# Patient Record
Sex: FEMALE | Race: BLACK OR AFRICAN AMERICAN | Employment: UNEMPLOYED | URBAN - METROPOLITAN AREA
[De-identification: names, ages, dates, MRNs, and addresses within clinical notes are randomized per-mention and may not be internally consistent; named-entity substitution may affect disease eponyms.]

---

## 2018-12-25 ENCOUNTER — HOSPITAL ENCOUNTER (EMERGENCY)
Age: 23
Discharge: HOME | End: 2018-12-25
Payer: COMMERCIAL

## 2018-12-25 VITALS
OXYGEN SATURATION: 100 % | SYSTOLIC BLOOD PRESSURE: 127 MMHG | HEIGHT: 68 IN | DIASTOLIC BLOOD PRESSURE: 82 MMHG | HEART RATE: 88 BPM | RESPIRATION RATE: 16 BRPM | BODY MASS INDEX: 29.6 KG/M2 | TEMPERATURE: 99.3 F | WEIGHT: 195.33 LBS

## 2018-12-25 DIAGNOSIS — R11.2 NAUSEA VOMITING AND DIARRHEA: Primary | ICD-10-CM

## 2018-12-25 DIAGNOSIS — R19.7 NAUSEA VOMITING AND DIARRHEA: Primary | ICD-10-CM

## 2018-12-25 DIAGNOSIS — R51.9 FREQUENT HEADACHES: ICD-10-CM

## 2018-12-25 LAB
A/G RATIO: 1.1 (ref 1.1–2.2)
ALBUMIN SERPL-MCNC: 4.4 G/DL (ref 3.4–5)
ALP BLD-CCNC: 89 U/L (ref 40–129)
ALT SERPL-CCNC: 7 U/L (ref 10–40)
ANION GAP SERPL CALCULATED.3IONS-SCNC: 15 MMOL/L (ref 3–16)
AST SERPL-CCNC: 15 U/L (ref 15–37)
BASOPHILS ABSOLUTE: 0 K/UL (ref 0–0.2)
BASOPHILS RELATIVE PERCENT: 0.3 %
BILIRUB SERPL-MCNC: <0.2 MG/DL (ref 0–1)
BILIRUBIN URINE: NEGATIVE
BLOOD, URINE: ABNORMAL
BUN BLDV-MCNC: 13 MG/DL (ref 7–20)
CALCIUM SERPL-MCNC: 9.4 MG/DL (ref 8.3–10.6)
CHLORIDE BLD-SCNC: 102 MMOL/L (ref 99–110)
CLARITY: ABNORMAL
CO2: 21 MMOL/L (ref 21–32)
COLOR: ABNORMAL
CREAT SERPL-MCNC: 0.7 MG/DL (ref 0.6–1.1)
EOSINOPHILS ABSOLUTE: 0 K/UL (ref 0–0.6)
EOSINOPHILS RELATIVE PERCENT: 0.3 %
EPITHELIAL CELLS, UA: NORMAL /HPF
GFR AFRICAN AMERICAN: >60
GFR NON-AFRICAN AMERICAN: >60
GLOBULIN: 4 G/DL
GLUCOSE BLD-MCNC: 85 MG/DL (ref 70–99)
GLUCOSE URINE: NEGATIVE MG/DL
HCG(URINE) PREGNANCY TEST: NEGATIVE
HCT VFR BLD CALC: 37.4 % (ref 36–48)
HEMOGLOBIN: 11.9 G/DL (ref 12–16)
KETONES, URINE: NEGATIVE MG/DL
LEUKOCYTE ESTERASE, URINE: ABNORMAL
LIPASE: 27 U/L (ref 13–60)
LYMPHOCYTES ABSOLUTE: 1.5 K/UL (ref 1–5.1)
LYMPHOCYTES RELATIVE PERCENT: 13.6 %
MCH RBC QN AUTO: 25.3 PG (ref 26–34)
MCHC RBC AUTO-ENTMCNC: 31.8 G/DL (ref 31–36)
MCV RBC AUTO: 79.5 FL (ref 80–100)
MICROSCOPIC EXAMINATION: YES
MONOCYTES ABSOLUTE: 0.7 K/UL (ref 0–1.3)
MONOCYTES RELATIVE PERCENT: 6.4 %
NEUTROPHILS ABSOLUTE: 8.6 K/UL (ref 1.7–7.7)
NEUTROPHILS RELATIVE PERCENT: 79.4 %
NITRITE, URINE: NEGATIVE
PDW BLD-RTO: 15.5 % (ref 12.4–15.4)
PH UA: 6
PLATELET # BLD: 312 K/UL (ref 135–450)
PMV BLD AUTO: 7.5 FL (ref 5–10.5)
POTASSIUM SERPL-SCNC: 3.9 MMOL/L (ref 3.5–5.1)
PROTEIN UA: NEGATIVE MG/DL
RBC # BLD: 4.7 M/UL (ref 4–5.2)
RBC UA: NORMAL /HPF (ref 0–2)
SODIUM BLD-SCNC: 138 MMOL/L (ref 136–145)
SPECIFIC GRAVITY UA: >=1.03
TOTAL PROTEIN: 8.4 G/DL (ref 6.4–8.2)
URINE REFLEX TO CULTURE: YES
URINE TYPE: ABNORMAL
UROBILINOGEN, URINE: 0.2 E.U./DL
WBC # BLD: 10.9 K/UL (ref 4–11)
WBC UA: NORMAL /HPF (ref 0–5)

## 2018-12-25 PROCEDURE — 96374 THER/PROPH/DIAG INJ IV PUSH: CPT

## 2018-12-25 PROCEDURE — 36415 COLL VENOUS BLD VENIPUNCTURE: CPT

## 2018-12-25 PROCEDURE — 84703 CHORIONIC GONADOTROPIN ASSAY: CPT

## 2018-12-25 PROCEDURE — 6360000002 HC RX W HCPCS: Performed by: PHYSICIAN ASSISTANT

## 2018-12-25 PROCEDURE — 2580000003 HC RX 258: Performed by: PHYSICIAN ASSISTANT

## 2018-12-25 PROCEDURE — 80053 COMPREHEN METABOLIC PANEL: CPT

## 2018-12-25 PROCEDURE — 96361 HYDRATE IV INFUSION ADD-ON: CPT

## 2018-12-25 PROCEDURE — 81001 URINALYSIS AUTO W/SCOPE: CPT

## 2018-12-25 PROCEDURE — 96375 TX/PRO/DX INJ NEW DRUG ADDON: CPT

## 2018-12-25 PROCEDURE — 87086 URINE CULTURE/COLONY COUNT: CPT

## 2018-12-25 PROCEDURE — 83690 ASSAY OF LIPASE: CPT

## 2018-12-25 PROCEDURE — 99284 EMERGENCY DEPT VISIT MOD MDM: CPT

## 2018-12-25 PROCEDURE — 85025 COMPLETE CBC W/AUTO DIFF WBC: CPT

## 2018-12-25 RX ORDER — KETOROLAC TROMETHAMINE 30 MG/ML
30 INJECTION, SOLUTION INTRAMUSCULAR; INTRAVENOUS ONCE
Status: COMPLETED | OUTPATIENT
Start: 2018-12-25 | End: 2018-12-25

## 2018-12-25 RX ORDER — 0.9 % SODIUM CHLORIDE 0.9 %
1000 INTRAVENOUS SOLUTION INTRAVENOUS ONCE
Status: COMPLETED | OUTPATIENT
Start: 2018-12-25 | End: 2018-12-25

## 2018-12-25 RX ORDER — ONDANSETRON 4 MG/1
4 TABLET, ORALLY DISINTEGRATING ORAL EVERY 8 HOURS PRN
Qty: 10 TABLET | Refills: 0 | Status: SHIPPED | OUTPATIENT
Start: 2018-12-25 | End: 2019-06-23 | Stop reason: ALTCHOICE

## 2018-12-25 RX ORDER — DICYCLOMINE HYDROCHLORIDE 10 MG/1
10 CAPSULE ORAL 4 TIMES DAILY
Qty: 10 CAPSULE | Refills: 0 | Status: SHIPPED | OUTPATIENT
Start: 2018-12-25 | End: 2022-02-11

## 2018-12-25 RX ORDER — ONDANSETRON 2 MG/ML
4 INJECTION INTRAMUSCULAR; INTRAVENOUS ONCE
Status: COMPLETED | OUTPATIENT
Start: 2018-12-25 | End: 2018-12-25

## 2018-12-25 RX ADMIN — KETOROLAC TROMETHAMINE 30 MG: 30 INJECTION, SOLUTION INTRAMUSCULAR at 18:12

## 2018-12-25 RX ADMIN — SODIUM CHLORIDE 1000 ML: 9 INJECTION, SOLUTION INTRAVENOUS at 18:12

## 2018-12-25 RX ADMIN — ONDANSETRON 4 MG: 2 INJECTION INTRAMUSCULAR; INTRAVENOUS at 18:12

## 2018-12-25 ASSESSMENT — ENCOUNTER SYMPTOMS
SORE THROAT: 0
NAUSEA: 1
ABDOMINAL PAIN: 1
DIARRHEA: 1
VOMITING: 1
BACK PAIN: 0
SHORTNESS OF BREATH: 0

## 2018-12-25 ASSESSMENT — PAIN SCALES - GENERAL
PAINLEVEL_OUTOF10: 3

## 2018-12-27 LAB — URINE CULTURE, ROUTINE: NORMAL

## 2018-12-27 NOTE — PROGRESS NOTES
Culture result reviewed, no further treatment needed. Patient's urine culture grew less than 10,000 colonies of mixed palomo. She did not receive antibiotics in the emergency department, and there is no indication for antibiotic treatment.

## 2019-06-23 ENCOUNTER — HOSPITAL ENCOUNTER (EMERGENCY)
Age: 24
Discharge: HOME | End: 2019-06-23
Payer: COMMERCIAL

## 2019-06-23 VITALS
HEART RATE: 93 BPM | OXYGEN SATURATION: 99 % | HEIGHT: 68 IN | SYSTOLIC BLOOD PRESSURE: 110 MMHG | BODY MASS INDEX: 31.94 KG/M2 | RESPIRATION RATE: 16 BRPM | WEIGHT: 210.76 LBS | DIASTOLIC BLOOD PRESSURE: 75 MMHG | TEMPERATURE: 97.8 F

## 2019-06-23 DIAGNOSIS — N39.0 URINARY TRACT INFECTION WITHOUT HEMATURIA, SITE UNSPECIFIED: Primary | ICD-10-CM

## 2019-06-23 LAB
AMORPHOUS: ABNORMAL /HPF
BILIRUBIN URINE: NEGATIVE
BLOOD, URINE: NEGATIVE
CLARITY: CLEAR
COLOR: YELLOW
EPITHELIAL CELLS, UA: ABNORMAL /HPF
GLUCOSE URINE: NEGATIVE MG/DL
KETONES, URINE: NEGATIVE MG/DL
LEUKOCYTE ESTERASE, URINE: ABNORMAL
MICROSCOPIC EXAMINATION: YES
MUCUS: ABNORMAL /LPF
NITRITE, URINE: NEGATIVE
PH UA: 7 (ref 5–8)
PROTEIN UA: NEGATIVE MG/DL
RBC UA: ABNORMAL /HPF (ref 0–2)
SPECIFIC GRAVITY UA: 1.01 (ref 1–1.03)
URINE REFLEX TO CULTURE: YES
URINE TYPE: ABNORMAL
UROBILINOGEN, URINE: 0.2 E.U./DL
WBC UA: ABNORMAL /HPF (ref 0–5)

## 2019-06-23 PROCEDURE — 81001 URINALYSIS AUTO W/SCOPE: CPT

## 2019-06-23 PROCEDURE — 6370000000 HC RX 637 (ALT 250 FOR IP): Performed by: NURSE PRACTITIONER

## 2019-06-23 PROCEDURE — 99283 EMERGENCY DEPT VISIT LOW MDM: CPT

## 2019-06-23 PROCEDURE — 87186 SC STD MICRODIL/AGAR DIL: CPT

## 2019-06-23 PROCEDURE — 87086 URINE CULTURE/COLONY COUNT: CPT

## 2019-06-23 PROCEDURE — 87077 CULTURE AEROBIC IDENTIFY: CPT

## 2019-06-23 RX ORDER — FERROUS SULFATE 325(65) MG
650 TABLET ORAL DAILY
Refills: 5 | COMMUNITY
Start: 2019-04-09 | End: 2022-09-15

## 2019-06-23 RX ORDER — CEPHALEXIN 250 MG/1
500 CAPSULE ORAL ONCE
Status: COMPLETED | OUTPATIENT
Start: 2019-06-23 | End: 2019-06-23

## 2019-06-23 RX ORDER — CEPHALEXIN 500 MG/1
500 CAPSULE ORAL 2 TIMES DAILY
Qty: 14 CAPSULE | Refills: 0 | Status: SHIPPED | OUTPATIENT
Start: 2019-06-23 | End: 2019-06-29 | Stop reason: ALTCHOICE

## 2019-06-23 RX ADMIN — CEPHALEXIN 500 MG: 250 CAPSULE ORAL at 20:53

## 2019-06-24 NOTE — ED PROVIDER NOTES
[address] [facility] Riya [id number]  Dept: [phone]  Loc: [address] ENCOUNTER        This patient was not seen or evaluated by the attending physician. I evaluated this patient, the attending physician was available for consultation. CHIEF COMPLAINT    Chief Complaint   Patient presents with    Other     7 months pregnant. Frequent urination, strong smell to it, back pain. Denies any pain nor buring with urination. Denies any vaginal discharge. States \"I know it's a UTI, I've had them before\"       HPI    Anusha Palm is a 26 y.o. female who presents with complaint of increased urinary frequency and urgency over the past 4 to 5 days. No dysuria, gross hematuria. She has had previous urinary tract infections, states that her symptoms are very similar to her previous urinary tract infections. No nausea, vomiting, abdominal pain, pelvic pain, unusual vaginal discharge or odor. The duration has been constant since the onset. She states that she is 7 months pregnant, just had a OB appointment 7 days ago and states that no issues have presented with the pregnancy. She denies any vaginal bleeding or spotting. she is on prenatals. She states that she came to the ED for further evaluation and treatment.     REVIEW OF SYSTEMS    : See HPI, no hematuria  GI: No vomiting or diarrhea  General: No measured fevers    PAST MEDICAL & SURGICAL HISTORY    Past Medical History:   Diagnosis Date    Anemia     Asthma      Past Surgical History:   Procedure Laterality Date     SECTION         CURRENT MEDICATIONS  (may include discharge medications prescribed in the ED)  Current Outpatient Rx   Medication Sig Dispense Refill    cephALEXin (KEFLEX) 500 MG capsule Take 1 capsule by mouth 2 times daily for 7 days 14 capsule 0    FEROSUL 325 (65 Fe) MG tablet Take 650 mg by mouth daily  5    dicyclomine (BENTYL) 10 MG capsule Take 1 capsule by mouth 4 times daily for 10 doses 10 capsule 0       ALLERGIES    No Known Allergies    SOCIAL HISTORY    Social History     Socioeconomic History    Marital status: Single     Spouse name: None    Number of children: None    Years of education: None    Highest education level: None   Occupational History    None   Social Needs    Financial resource strain: None    Food insecurity:     Worry: None     Inability: None    Transportation needs:     Medical: None     Non-medical: None   Tobacco Use    Smoking status: Never Smoker    Smokeless tobacco: Never Used   Substance and Sexual Activity    Alcohol use: No    Drug use: No    Sexual activity: None   Lifestyle    Physical activity:     Days per week: None     Minutes per session: None    Stress: None   Relationships    Social connections:     Talks on phone: None     Gets together: None     Attends Restoration service: None     Active member of club or organization: None     Attends meetings of clubs or organizations: None     Relationship status: None    Intimate partner violence:     Fear of current or ex partner: None     Emotionally abused: None     Physically abused: None     Forced sexual activity: None   Other Topics Concern    None   Social History Narrative    None       PHYSICAL EXAM    VITAL SIGNS: /75   Pulse 93   Temp 97.8 °F (36.6 °C) (Oral)   Resp 16   Ht 5' 8\" (1.727 m)   Wt 210 lb 12.2 oz (95.6 kg)   LMP 2019   SpO2 99%   BMI 32.05 kg/m²    Constitutional:  Well developed, well nourished  HENT:  Atraumatic,  Moist mucus membranes  EYES: Conjunctiva Moist, Nonicteric  NECK: No JVD, supple   Respiratory:  No respiratory distress  Cardiovascular: no JVD   Abdomen:  Soft,  abdomen with no abdominal tenderness to palpation, no CVA flank tenderness; I did feel the fetus moving on my exam.  Integument:  Skin is warm and dry   Neurologic: Awake, alert, normal flow speech, no tremors    RADIOLOGY/PROCEDURES    No orders to display       ED COURSE & MEDICAL DECISION MAKING    Pertinent Labs studies interpreted and reviewed. (See chart for details)  See chart for details of medications given during the ED stay. Differential Diagnosis: Urinary retention, pyelonephritis, bladder infection, urosepsis, GI emergency, surgical emergency, dehydration, other    Patient is afebrile and nontoxic in appearance. No abdominal tenderness or CVA tenderness on exam.  Urinalysis reveals small amount of leukocytes with microscopic urinalysis showing 20-50 WBC and 1+ mucus. I will treat her urinary tract infection with a course of Keflex. Patient is not concerned about any STDs, has no pelvic discharge or odor or vaginal complaints and therefore I do not believe that further testing is warranted at this point. I instructed the patient to follow up as an outpatient in 2 days. I instructed the patient to return to the ED immediately for any new or worsening symptoms. The patient verbalizes understanding. FINAL IMPRESSION    1.  Urinary tract infection without hematuria, site unspecified        PLAN  Discharge with outpatient followup, instructions and medication (see EMR)     (Please note that this note was completed with a voice recognition program.  Every attempt was made to edit the dictations, but inevitably there remain words that are mis-transcribed.)                DARREN Calderon - ALFREDO  06/23/19 3203

## 2019-06-26 LAB
ORGANISM: ABNORMAL
URINE CULTURE, ROUTINE: ABNORMAL
URINE CULTURE, ROUTINE: ABNORMAL

## 2019-06-26 NOTE — RESULT ENCOUNTER NOTE
Culture reviewed, culture is positive but resistant to antibiotics prescribed at discharge. Antibiotic needs to be changed to cipro 500mg PO BID x10d, #20.

## 2019-06-27 NOTE — RESULT ENCOUNTER NOTE
Patient's positive result has been appropriately evaluated by the provider pool. Patient was unable to be reached over the phone. A voicemail was left with the patient. Will await a return phone call. Will try to reach patient again tomorrow per protocol. since I spoke with her at 3pm  I have tried to call her 2 more times and no answer  Message left on phone to call us back

## 2019-06-28 NOTE — RESULT ENCOUNTER NOTE
Unsuccessful attempts to reach patient by telephone. Certified letter sent to patient's address on file.

## 2019-06-29 ENCOUNTER — HOSPITAL ENCOUNTER (EMERGENCY)
Age: 24
Discharge: HOME | End: 2019-06-29
Attending: EMERGENCY MEDICINE
Payer: COMMERCIAL

## 2019-06-29 VITALS
WEIGHT: 211.86 LBS | BODY MASS INDEX: 32.11 KG/M2 | HEIGHT: 68 IN | OXYGEN SATURATION: 100 % | SYSTOLIC BLOOD PRESSURE: 104 MMHG | RESPIRATION RATE: 16 BRPM | DIASTOLIC BLOOD PRESSURE: 67 MMHG | HEART RATE: 84 BPM | TEMPERATURE: 98.1 F

## 2019-06-29 DIAGNOSIS — N30.00 ACUTE CYSTITIS WITHOUT HEMATURIA: Primary | ICD-10-CM

## 2019-06-29 PROCEDURE — 99281 EMR DPT VST MAYX REQ PHY/QHP: CPT

## 2019-06-29 RX ORDER — CEFUROXIME AXETIL 500 MG/1
500 TABLET ORAL 2 TIMES DAILY
Qty: 20 TABLET | Refills: 0 | Status: SHIPPED | OUTPATIENT
Start: 2019-06-29 | End: 2019-07-09

## 2019-06-29 RX ORDER — ACETAMINOPHEN 325 MG/1
650 TABLET ORAL EVERY 6 HOURS PRN
COMMUNITY
End: 2022-09-15

## 2019-06-29 ASSESSMENT — PAIN DESCRIPTION - DESCRIPTORS: DESCRIPTORS: BURNING

## 2019-06-29 ASSESSMENT — PAIN DESCRIPTION - PAIN TYPE: TYPE: ACUTE PAIN

## 2019-06-29 ASSESSMENT — PAIN SCALES - GENERAL: PAINLEVEL_OUTOF10: 2

## 2019-06-29 ASSESSMENT — PAIN DESCRIPTION - LOCATION: LOCATION: OTHER (COMMENT)

## 2019-06-29 ASSESSMENT — PAIN DESCRIPTION - FREQUENCY: FREQUENCY: INTERMITTENT

## 2019-06-29 NOTE — ED PROVIDER NOTES
CHIEF COMPLAINT  Urinary Tract Infection (pt came to ER for medication change being treated for UTI)      HISTORY OF PRESENT ILLNESS  Osman Peterson is a 26 y.o. female who presents to the ED requesting a different antibiotic for urinary tract infection. The patient states that she was seen in our emergency department approximately 6 days ago and diagnosed with a bladder infection. She was started on antibiotics. States she was called at home and advised that they would need to change her antibiotics. She states that she did not receive a follow-up call and comes in today. She states she is continued to have some mild dysuria. No fever. No chills. No back pain. No abdominal pain. No vaginal discharge, no vaginal bleeding. She continues to have fetal movement -no change in how much she is noticed fetal movement. She is tolerating p.o. Well. Her only symptom is mild dysuria. No other complaints, modifying factors or associated symptoms. Nursing notes reviewed. Past Medical History:   Diagnosis Date    Anemia     Asthma      Past Surgical History:   Procedure Laterality Date     SECTION       History reviewed. No pertinent family history.   Social History     Socioeconomic History    Marital status: Single     Spouse name: Not on file    Number of children: Not on file    Years of education: Not on file    Highest education level: Not on file   Occupational History    Not on file   Social Needs    Financial resource strain: Not on file    Food insecurity:     Worry: Not on file     Inability: Not on file    Transportation needs:     Medical: Not on file     Non-medical: Not on file   Tobacco Use    Smoking status: Never Smoker    Smokeless tobacco: Never Used   Substance and Sexual Activity    Alcohol use: No    Drug use: No    Sexual activity: Not on file   Lifestyle    Physical activity:     Days per week: Not on file     Minutes per session: Not on file    Stress: Not on file   Relationships    Social connections:     Talks on phone: Not on file     Gets together: Not on file     Attends Restorationism service: Not on file     Active member of club or organization: Not on file     Attends meetings of clubs or organizations: Not on file     Relationship status: Not on file    Intimate partner violence:     Fear of current or ex partner: Not on file     Emotionally abused: Not on file     Physically abused: Not on file     Forced sexual activity: Not on file   Other Topics Concern    Not on file   Social History Narrative    Not on file     No current facility-administered medications for this encounter. Current Outpatient Medications   Medication Sig Dispense Refill    acetaminophen (TYLENOL) 325 MG tablet Take 650 mg by mouth every 6 hours as needed for Pain      cefUROXime (CEFTIN) 500 MG tablet Take 1 tablet by mouth 2 times daily for 10 days 20 tablet 0    FEROSUL 325 (65 Fe) MG tablet Take 650 mg by mouth daily  5    dicyclomine (BENTYL) 10 MG capsule Take 1 capsule by mouth 4 times daily for 10 doses 10 capsule 0     No Known Allergies    REVIEW OF SYSTEMS  6 systems reviewed, pertinent positives per HPI otherwise noted to be negative    PHYSICAL EXAM  /67   Pulse 84   Temp 98.1 °F (36.7 °C) (Oral)   Resp 16   Ht 5' 8\" (1.727 m)   Wt 211 lb 13.8 oz (96.1 kg)   LMP 01/02/2019   SpO2 100%   BMI 32.21 kg/m²   GENERAL APPEARANCE: Awake and alert. Cooperative. No acute distress. HEAD: Normocephalic. Atraumatic. EYES: PERRL. EOM's grossly intact. ENT: Mucous membranes are moist.   NECK: Supple. Normal ROM. CHEST: Equal symmetric chest rise. LUNGS: Breathing is unlabored. Speaking comfortably in full sentences. Abd: Soft. Gravid. Non-tender. EXTREMITIES: . MAEE. No acute deformities. All extremities neurovascularly intact. SKIN: Warm and dry. NEUROLOGICAL: Alert and oriented. Normal coordination.  Gait normal.         RADIOLOGY  X-RAYS:  I have reviewed radiologic plain film image(s). ALL OTHER NON-PLAIN FILM IMAGES SUCH AS CT, ULTRASOUND AND MRI HAVE BEEN READ BY THE RADIOLOGIST. No orders to display              PROCEDURES    ED COURSE/MDM  Patient seen and evaluated. I asked her to stop the Keflex and gave her a prescription of cefuroxime axetil 500 mg po BID x 10 days. I discussed results and plan of care with patient and family. I do feel patient can be safely discharged to home. Recommend follow up with OB in 2-3 days for re-evaluation. Reasons to RT ED discussed. Patient expresses understanding and is in agreement with plan. Patient was given scripts for the following medications. I counseled patient how to take these medications. New Prescriptions    CEFUROXIME (CEFTIN) 500 MG TABLET    Take 1 tablet by mouth 2 times daily for 10 days           CLINICAL IMPRESSION  1. Acute cystitis without hematuria        Blood pressure 104/67, pulse 84, temperature 98.1 °F (36.7 °C), temperature source Oral, resp. rate 16, height 5' 8\" (1.727 m), weight 211 lb 13.8 oz (96.1 kg), last menstrual period 01/02/2019, SpO2 100 %. DISPOSITION  Patient was discharged to home in good condition.         Annamarie Newman MD  06/29/19 3994

## 2019-10-29 ENCOUNTER — APPOINTMENT (OUTPATIENT)
Dept: CT IMAGING | Age: 24
End: 2019-10-29
Payer: COMMERCIAL

## 2019-10-29 ENCOUNTER — HOSPITAL ENCOUNTER (EMERGENCY)
Age: 24
Discharge: HOME | End: 2019-10-29
Attending: EMERGENCY MEDICINE
Payer: COMMERCIAL

## 2019-10-29 VITALS
TEMPERATURE: 98.6 F | BODY MASS INDEX: 30.94 KG/M2 | SYSTOLIC BLOOD PRESSURE: 120 MMHG | DIASTOLIC BLOOD PRESSURE: 86 MMHG | HEART RATE: 74 BPM | HEIGHT: 68 IN | RESPIRATION RATE: 16 BRPM | OXYGEN SATURATION: 98 % | WEIGHT: 204.15 LBS

## 2019-10-29 DIAGNOSIS — N30.01 ACUTE CYSTITIS WITH HEMATURIA: Primary | ICD-10-CM

## 2019-10-29 LAB
A/G RATIO: 1.1 (ref 1.1–2.2)
ALBUMIN SERPL-MCNC: 4 G/DL (ref 3.4–5)
ALP BLD-CCNC: 98 U/L (ref 40–129)
ALT SERPL-CCNC: 16 U/L (ref 10–40)
ANION GAP SERPL CALCULATED.3IONS-SCNC: 14 MMOL/L (ref 3–16)
AST SERPL-CCNC: 18 U/L (ref 15–37)
BACTERIA: ABNORMAL /HPF
BASOPHILS ABSOLUTE: 0 K/UL (ref 0–0.2)
BASOPHILS RELATIVE PERCENT: 0.8 %
BILIRUB SERPL-MCNC: <0.2 MG/DL (ref 0–1)
BILIRUBIN URINE: NEGATIVE
BLOOD, URINE: ABNORMAL
BUN BLDV-MCNC: 5 MG/DL (ref 7–20)
CALCIUM SERPL-MCNC: 9.4 MG/DL (ref 8.3–10.6)
CHLORIDE BLD-SCNC: 106 MMOL/L (ref 99–110)
CLARITY: CLEAR
CO2: 25 MMOL/L (ref 21–32)
COLOR: YELLOW
CREAT SERPL-MCNC: 0.6 MG/DL (ref 0.6–1.1)
EOSINOPHILS ABSOLUTE: 0.3 K/UL (ref 0–0.6)
EOSINOPHILS RELATIVE PERCENT: 6.8 %
EPITHELIAL CELLS, UA: ABNORMAL /HPF
GFR AFRICAN AMERICAN: >60
GFR NON-AFRICAN AMERICAN: >60
GLOBULIN: 3.5 G/DL
GLUCOSE BLD-MCNC: 104 MG/DL (ref 70–99)
GLUCOSE URINE: NEGATIVE MG/DL
HCG(URINE) PREGNANCY TEST: NEGATIVE
HCT VFR BLD CALC: 38.8 % (ref 36–48)
HEMOGLOBIN: 13.1 G/DL (ref 12–16)
KETONES, URINE: NEGATIVE MG/DL
LEUKOCYTE ESTERASE, URINE: ABNORMAL
LYMPHOCYTES ABSOLUTE: 2.8 K/UL (ref 1–5.1)
LYMPHOCYTES RELATIVE PERCENT: 58.1 %
MAGNESIUM: 1.6 MG/DL (ref 1.8–2.4)
MCH RBC QN AUTO: 30.8 PG (ref 26–34)
MCHC RBC AUTO-ENTMCNC: 33.9 G/DL (ref 31–36)
MCV RBC AUTO: 90.8 FL (ref 80–100)
MICROSCOPIC EXAMINATION: YES
MONOCYTES ABSOLUTE: 0.3 K/UL (ref 0–1.3)
MONOCYTES RELATIVE PERCENT: 6.8 %
NEUTROPHILS ABSOLUTE: 1.3 K/UL (ref 1.7–7.7)
NEUTROPHILS RELATIVE PERCENT: 27.5 %
NITRITE, URINE: NEGATIVE
PDW BLD-RTO: 15 % (ref 12.4–15.4)
PH UA: 6.5 (ref 5–8)
PLATELET # BLD: 255 K/UL (ref 135–450)
PMV BLD AUTO: 8 FL (ref 5–10.5)
POTASSIUM REFLEX MAGNESIUM: 3.3 MMOL/L (ref 3.5–5.1)
PROTEIN UA: NEGATIVE MG/DL
RBC # BLD: 4.28 M/UL (ref 4–5.2)
RBC UA: ABNORMAL /HPF (ref 0–2)
SODIUM BLD-SCNC: 145 MMOL/L (ref 136–145)
SPECIFIC GRAVITY UA: <=1.005 (ref 1–1.03)
TOTAL PROTEIN: 7.5 G/DL (ref 6.4–8.2)
URINE REFLEX TO CULTURE: YES
URINE TYPE: ABNORMAL
UROBILINOGEN, URINE: 0.2 E.U./DL
WBC # BLD: 4.9 K/UL (ref 4–11)
WBC UA: ABNORMAL /HPF (ref 0–5)

## 2019-10-29 PROCEDURE — 81001 URINALYSIS AUTO W/SCOPE: CPT

## 2019-10-29 PROCEDURE — 87077 CULTURE AEROBIC IDENTIFY: CPT

## 2019-10-29 PROCEDURE — 87186 SC STD MICRODIL/AGAR DIL: CPT

## 2019-10-29 PROCEDURE — 74176 CT ABD & PELVIS W/O CONTRAST: CPT

## 2019-10-29 PROCEDURE — 85025 COMPLETE CBC W/AUTO DIFF WBC: CPT

## 2019-10-29 PROCEDURE — 36415 COLL VENOUS BLD VENIPUNCTURE: CPT

## 2019-10-29 PROCEDURE — 84703 CHORIONIC GONADOTROPIN ASSAY: CPT

## 2019-10-29 PROCEDURE — 83735 ASSAY OF MAGNESIUM: CPT

## 2019-10-29 PROCEDURE — 87086 URINE CULTURE/COLONY COUNT: CPT

## 2019-10-29 PROCEDURE — 80053 COMPREHEN METABOLIC PANEL: CPT

## 2019-10-29 PROCEDURE — 99284 EMERGENCY DEPT VISIT MOD MDM: CPT

## 2019-10-29 RX ORDER — SULFAMETHOXAZOLE AND TRIMETHOPRIM 800; 160 MG/1; MG/1
1 TABLET ORAL 2 TIMES DAILY
Qty: 14 TABLET | Refills: 0 | Status: SHIPPED | OUTPATIENT
Start: 2019-10-29 | End: 2019-11-05

## 2019-10-29 ASSESSMENT — PAIN DESCRIPTION - DESCRIPTORS: DESCRIPTORS: ACHING

## 2019-10-29 ASSESSMENT — PAIN DESCRIPTION - ORIENTATION
ORIENTATION: LEFT;LOWER
ORIENTATION: LEFT;LOWER

## 2019-10-29 ASSESSMENT — PAIN DESCRIPTION - LOCATION
LOCATION: BACK
LOCATION: BACK

## 2019-10-29 ASSESSMENT — PAIN DESCRIPTION - PAIN TYPE
TYPE: ACUTE PAIN
TYPE: ACUTE PAIN

## 2019-10-29 ASSESSMENT — PAIN SCALES - GENERAL
PAINLEVEL_OUTOF10: 6
PAINLEVEL_OUTOF10: 5

## 2019-10-29 ASSESSMENT — PAIN DESCRIPTION - FREQUENCY: FREQUENCY: INTERMITTENT

## 2019-10-31 LAB
ORGANISM: ABNORMAL
URINE CULTURE, ROUTINE: ABNORMAL

## 2022-02-11 NOTE — H&P
[place] [id number]                     [address] [id number]                       PREOPERATIVE HISTORY AND PHYSICAL    PATIENT NAME: Mary Falcon                      :        1995  MED REC NO:   [id number]                          ROOM:  ACCOUNT NO:   [de-identified]                           ADMIT DATE: 02/15/2022  PROVIDER:     Henrietta Balderas MD    DIAGNOSIS:  Ventral hernia and lipodystrophy of abdomen. PLANNED PROCEDURE:  Abdominoplasty. INDICATION:  A 79-year-old black female seen in consultation for very  large ventral hernia as well as lipodystrophy of abdomen. After  discussing the options at length, elected to proceed with definitive  abdominoplasty. We also repaired the ventral hernia. The issue of  bleeding, infection, wound dehiscence, scarring, as well as the issue of  umbilical malposition and most importantly the issue of DVT formation,  which may lead to pulmonary embolus was discussed and consent was  obtained. PAST MEDICAL HISTORY:  Unremarkable. PAST SURGICAL HISTORY:  Only surgical history was prior  scars  as well as cholecystectomy. ALLERGIES:  No allergies. MEDICATIONS:  Takes no medications. SOCIAL HISTORY:  Nonsmoker. PHYSICAL EXAMINATION:  GENERAL:  Examination reveals a pleasant black female, in no apparent  distress. VITAL SIGNS:  Stable vital signs. LUNGS:  Clear. HEART:  Regular rate and rhythm. ABDOMEN:  Examination of her abdomen can better be seen by reviewing her  preop photographs. Her ventral hernia is easily reducible. IMPRESSION AND PLAN:  The patient will undergo definitive abdominoplasty  and repair of ventral hernia.         Cecelia Colindres MD    D: 2022 8:02:50       T: 2022 11:07:06     [id number]  Job#: [id number]     Doc#: [id number]    CC:

## 2022-02-11 NOTE — PROGRESS NOTES
Name_______________________________________Printed:____________________  Date and time of fbadina__[id number]_________[id number]_____________Whwgbdi Time:_____1115___________   1. The instructions given regarding when and if a patient needs to stop oral intake prior to surgery varies. Follow the specific instructions you were given                  __x_Nothing to eat or to drink after Midnight the night before.                   ____Carbo loading or ERAS instructions will be given to select patients-if you have been given those instructions -please do the following                           The evening before your surgery after dinner before midnight drink 40 ounces of gatorade. If you are diabetic use sugar free. The morning of surgery drink 40 ounces of water. This needs to be finished 3 hours prior to your surgery start time. 2. Take the following pills with a small sip of water on the morning of surgery___________________________________________________                  Do not take blood pressure medications ending in pril or sartan the yumiko prior to surgery or the morning of surgery_   3. Aspirin, Ibuprofen, Advil, Naproxen, Vitamin E and other Anti-inflammatory products and supplements should be stopped for 5 -7days before surgery or as directed by your physician. 4. Check with your Doctor regarding stopping Plavix, Coumadin,Eliquis, Lovenox,Effient,Pradaxa,Xarelto, Fragmin or other blood thinners and follow their instructions. 5. Do not smoke, and do not drink any alcoholic beverages 24 hours prior to surgery. This includes NA Beer. Refrain from the usage of any recreational drugs. 6. You may brush your teeth and gargle the morning of surgery. DO NOT SWALLOW WATER   7. You MUST make arrangements for a responsible adult to stay on site while you are here and take you home after your surgery. You will not be allowed to leave alone or drive yourself home.   It is strongly suggested someone stay with you the first 24 hrs. Your surgery will be cancelled if you do not have a ride home. 8. A parent/legal guardian must accompany a child scheduled for surgery and plan to stay at the hospital until the child is discharged. Please do not bring other children with you. 9. Please wear simple, loose fitting clothing to the hospital.  Quinton Foyer not bring valuables (money, credit cards, checkbooks, etc.) Do not wear any makeup (including no eye makeup) or nail polish on your fingers or toes. 10. DO NOT wear any jewelry or piercings on day of surgery. All body piercing jewelry must be removed. 11. If you have ___dentures, they will be removed before going to the OR; we will provide you a container. If you wear ___contact lenses or ___glasses, they will be removed; please bring a case for them. 12. Please see your family doctor/pediatrician for a history & physical and/or concerning medications. Bring any test results/reports from your physician's office. PCP__________________Phone___________H&P Appt. Date________             13 If you  have a Living Will and Durable Power of  for Healthcare, please bring in a copy. 15. Notify your Surgeon if you develop any illness between now and surgery  time, cough, cold, fever, sore throat, nausea, vomiting, etc.  Please notify your surgeon if you experience dizziness, shortness of breath or blurred vision between now & the time of your surgery             15. DO NOT shave your operative site 96 hours prior to surgery. For face & neck surgery, men may use an electric razor 48 hours prior to surgery. 16. Shower the night before or morning of surgery using an antibacterial soap or as you have been instructed. 17. To provide excellent care visitors will be limited to one in the room at any given time. 18.  Please bring picture ID and insurance card.              19.  Visit our web site for additional information:  [email or web address]              20.During flu season no children under the age of 15 are permitted in the hospital for the safety of all patients. 21. If you take a long acting insulin in the evening only  take half of your usual  dose the night  before your procedure              22. If you use a c-pap please bring DOS if staying overnight,             23.For your convenience [id number] [place] [address] has a pharmacy on site to fill your prescriptions. 24. If you use oxygen and have a portable tank please bring it  with you the DOS             25. Bring a complete list of all your medications with name and dose include any supplements. 26. Other__________________________________________   *Please call pre admission testing if you any further questions   Padmini Yin   [address] [place] [address]. [place]  [phone]   [facility] [address] ANGELICA MCDONALD   [phone]           COVID TESTING    _  _x_ Covid test to be done 3-5 days prior to scheduled surgery -patient aware they are REQUIRED to bring a copy of the negative result DOS-if they receive a positive result to notify their surgeon         If known - indicate where patient is getting covid test done ___________________________________________________________    ___ Rapid - DOS    ___ Other___________________________________      Audra Fairbank POLICY(subject to change)    There is a one visitor policy at [facility] for all surgeries and endoscopies. Whether the visitor can stay or will be asked to wait in the car will depend on the current policy and if social distancing can be maintained. The policy is subject to change at any time. Please make sure the visitor has a cell phone that is on,charged and able to accept calls, as this may be the way that the staff communicates with them. Pain management is NO VISITOR policyThe patients ride is expected to remain in the car with a cell phone for communication. If the ride is leaving the hospital grounds please make sure they are back in time for pickup. Have the patient inform the staff on arrival what their rides plans are while the patient is in the facility. At the MAIN there is one visitor allowed. Please note that the visitor policy is subject to change. All above information reviewed with patient in person or by phone. Patient verbalizes understanding. All questions and concerns addressed.                                                                                                  Patient/Rep___patient_________________                                                                                                                                    PRE OP INSTRUCTIONS

## 2022-02-15 ENCOUNTER — ANESTHESIA EVENT (OUTPATIENT)
Dept: OPERATING ROOM | Age: 27
End: 2022-02-15

## 2022-02-15 ENCOUNTER — HOSPITAL ENCOUNTER (OUTPATIENT)
Age: 27
Setting detail: OUTPATIENT SURGERY
Discharge: HOME | End: 2022-02-15
Attending: PLASTIC SURGERY | Admitting: PLASTIC SURGERY

## 2022-02-15 ENCOUNTER — ANESTHESIA (OUTPATIENT)
Dept: OPERATING ROOM | Age: 27
End: 2022-02-15

## 2022-02-15 VITALS
RESPIRATION RATE: 11 BRPM | SYSTOLIC BLOOD PRESSURE: 76 MMHG | TEMPERATURE: 96.8 F | DIASTOLIC BLOOD PRESSURE: 41 MMHG | OXYGEN SATURATION: 99 %

## 2022-02-15 VITALS
DIASTOLIC BLOOD PRESSURE: 90 MMHG | RESPIRATION RATE: 14 BRPM | OXYGEN SATURATION: 98 % | HEART RATE: 76 BPM | SYSTOLIC BLOOD PRESSURE: 124 MMHG | HEIGHT: 68 IN | WEIGHT: 220 LBS | TEMPERATURE: 97 F | BODY MASS INDEX: 33.34 KG/M2

## 2022-02-15 DIAGNOSIS — Z41.1 ENCOUNTER FOR COSMETIC SURGERY: Primary | ICD-10-CM

## 2022-02-15 LAB — HCG(URINE) PREGNANCY TEST: NEGATIVE

## 2022-02-15 PROCEDURE — 6360000002 HC RX W HCPCS: Performed by: NURSE ANESTHETIST, CERTIFIED REGISTERED

## 2022-02-15 PROCEDURE — 6360000002 HC RX W HCPCS: Performed by: PLASTIC SURGERY

## 2022-02-15 PROCEDURE — 7100000010 HC PHASE II RECOVERY - FIRST 15 MIN: Performed by: PLASTIC SURGERY

## 2022-02-15 PROCEDURE — 3700000001 HC ADD 15 MINUTES (ANESTHESIA): Performed by: PLASTIC SURGERY

## 2022-02-15 PROCEDURE — A4217 STERILE WATER/SALINE, 500 ML: HCPCS | Performed by: PLASTIC SURGERY

## 2022-02-15 PROCEDURE — 2500000003 HC RX 250 WO HCPCS: Performed by: NURSE ANESTHETIST, CERTIFIED REGISTERED

## 2022-02-15 PROCEDURE — 2580000003 HC RX 258: Performed by: PLASTIC SURGERY

## 2022-02-15 PROCEDURE — 2500000003 HC RX 250 WO HCPCS: Performed by: PLASTIC SURGERY

## 2022-02-15 PROCEDURE — 2709999900 HC NON-CHARGEABLE SUPPLY: Performed by: PLASTIC SURGERY

## 2022-02-15 PROCEDURE — 84703 CHORIONIC GONADOTROPIN ASSAY: CPT

## 2022-02-15 PROCEDURE — 3600000013 HC SURGERY LEVEL 3 ADDTL 15MIN: Performed by: PLASTIC SURGERY

## 2022-02-15 PROCEDURE — 6370000000 HC RX 637 (ALT 250 FOR IP): Performed by: PLASTIC SURGERY

## 2022-02-15 PROCEDURE — 6360000002 HC RX W HCPCS: Performed by: ANESTHESIOLOGY

## 2022-02-15 PROCEDURE — 3700000000 HC ANESTHESIA ATTENDED CARE: Performed by: PLASTIC SURGERY

## 2022-02-15 PROCEDURE — 7100000001 HC PACU RECOVERY - ADDTL 15 MIN: Performed by: PLASTIC SURGERY

## 2022-02-15 PROCEDURE — 7100000000 HC PACU RECOVERY - FIRST 15 MIN: Performed by: PLASTIC SURGERY

## 2022-02-15 PROCEDURE — 7100000011 HC PHASE II RECOVERY - ADDTL 15 MIN: Performed by: PLASTIC SURGERY

## 2022-02-15 PROCEDURE — 3600000003 HC SURGERY LEVEL 3 BASE: Performed by: PLASTIC SURGERY

## 2022-02-15 RX ORDER — HYDRALAZINE HYDROCHLORIDE 20 MG/ML
5 INJECTION INTRAMUSCULAR; INTRAVENOUS EVERY 10 MIN PRN
Status: DISCONTINUED | OUTPATIENT
Start: 2022-02-15 | End: 2022-02-15 | Stop reason: HOSPADM

## 2022-02-15 RX ORDER — OXYCODONE HYDROCHLORIDE AND ACETAMINOPHEN 5; 325 MG/1; MG/1
1 TABLET ORAL
Status: DISCONTINUED | OUTPATIENT
Start: 2022-02-15 | End: 2022-02-15 | Stop reason: HOSPADM

## 2022-02-15 RX ORDER — CYCLOBENZAPRINE HCL 10 MG
10 TABLET ORAL 3 TIMES DAILY
Qty: 21 TABLET | Refills: 0 | Status: SHIPPED | OUTPATIENT
Start: 2022-02-15 | End: 2022-02-22

## 2022-02-15 RX ORDER — HYDROMORPHONE HCL 110MG/55ML
0.5 PATIENT CONTROLLED ANALGESIA SYRINGE INTRAVENOUS EVERY 5 MIN PRN
Status: DISCONTINUED | OUTPATIENT
Start: 2022-02-15 | End: 2022-02-15 | Stop reason: HOSPADM

## 2022-02-15 RX ORDER — PHENYLEPHRINE HCL IN 0.9% NACL 1 MG/10 ML
SYRINGE (ML) INTRAVENOUS PRN
Status: DISCONTINUED | OUTPATIENT
Start: 2022-02-15 | End: 2022-02-15 | Stop reason: SDUPTHER

## 2022-02-15 RX ORDER — CEPHALEXIN 500 MG/1
500 CAPSULE ORAL 4 TIMES DAILY
Qty: 28 CAPSULE | Refills: 0 | Status: SHIPPED | OUTPATIENT
Start: 2022-02-15 | End: 2022-02-22

## 2022-02-15 RX ORDER — LIDOCAINE HYDROCHLORIDE 10 MG/ML
0.5 INJECTION, SOLUTION EPIDURAL; INFILTRATION; INTRACAUDAL; PERINEURAL ONCE
Status: DISCONTINUED | OUTPATIENT
Start: 2022-02-15 | End: 2022-02-15 | Stop reason: HOSPADM

## 2022-02-15 RX ORDER — LIDOCAINE HYDROCHLORIDE 20 MG/ML
INJECTION, SOLUTION EPIDURAL; INFILTRATION; INTRACAUDAL; PERINEURAL PRN
Status: DISCONTINUED | OUTPATIENT
Start: 2022-02-15 | End: 2022-02-15 | Stop reason: SDUPTHER

## 2022-02-15 RX ORDER — ROCURONIUM BROMIDE 10 MG/ML
INJECTION, SOLUTION INTRAVENOUS PRN
Status: DISCONTINUED | OUTPATIENT
Start: 2022-02-15 | End: 2022-02-15 | Stop reason: SDUPTHER

## 2022-02-15 RX ORDER — BUPIVACAINE HYDROCHLORIDE 5 MG/ML
INJECTION, SOLUTION EPIDURAL; INTRACAUDAL
Status: COMPLETED | OUTPATIENT
Start: 2022-02-15 | End: 2022-02-15

## 2022-02-15 RX ORDER — LABETALOL HYDROCHLORIDE 5 MG/ML
5 INJECTION, SOLUTION INTRAVENOUS EVERY 10 MIN PRN
Status: DISCONTINUED | OUTPATIENT
Start: 2022-02-15 | End: 2022-02-15 | Stop reason: HOSPADM

## 2022-02-15 RX ORDER — BACITRACIN ZINC AND POLYMYXIN B SULFATE 500; 1000 [USP'U]/G; [USP'U]/G
OINTMENT TOPICAL
Status: COMPLETED | OUTPATIENT
Start: 2022-02-15 | End: 2022-02-15

## 2022-02-15 RX ORDER — ONDANSETRON 2 MG/ML
INJECTION INTRAMUSCULAR; INTRAVENOUS PRN
Status: DISCONTINUED | OUTPATIENT
Start: 2022-02-15 | End: 2022-02-15 | Stop reason: SDUPTHER

## 2022-02-15 RX ORDER — MEPERIDINE HYDROCHLORIDE 25 MG/ML
12.5 INJECTION INTRAMUSCULAR; INTRAVENOUS; SUBCUTANEOUS EVERY 5 MIN PRN
Status: DISCONTINUED | OUTPATIENT
Start: 2022-02-15 | End: 2022-02-15 | Stop reason: HOSPADM

## 2022-02-15 RX ORDER — ONDANSETRON 2 MG/ML
4 INJECTION INTRAMUSCULAR; INTRAVENOUS
Status: COMPLETED | OUTPATIENT
Start: 2022-02-15 | End: 2022-02-15

## 2022-02-15 RX ORDER — RIVAROXABAN 10 MG/1
10 TABLET, FILM COATED ORAL
Qty: 10 TABLET | Refills: 1 | Status: SHIPPED | OUTPATIENT
Start: 2022-02-15 | End: 2022-09-15

## 2022-02-15 RX ORDER — FENTANYL CITRATE 50 UG/ML
INJECTION, SOLUTION INTRAMUSCULAR; INTRAVENOUS PRN
Status: DISCONTINUED | OUTPATIENT
Start: 2022-02-15 | End: 2022-02-15 | Stop reason: SDUPTHER

## 2022-02-15 RX ORDER — DEXAMETHASONE SODIUM PHOSPHATE 4 MG/ML
INJECTION, SOLUTION INTRA-ARTICULAR; INTRALESIONAL; INTRAMUSCULAR; INTRAVENOUS; SOFT TISSUE PRN
Status: DISCONTINUED | OUTPATIENT
Start: 2022-02-15 | End: 2022-02-15 | Stop reason: SDUPTHER

## 2022-02-15 RX ORDER — MIDAZOLAM HYDROCHLORIDE 1 MG/ML
INJECTION INTRAMUSCULAR; INTRAVENOUS PRN
Status: DISCONTINUED | OUTPATIENT
Start: 2022-02-15 | End: 2022-02-15 | Stop reason: SDUPTHER

## 2022-02-15 RX ORDER — MAGNESIUM SULFATE HEPTAHYDRATE 500 MG/ML
INJECTION, SOLUTION INTRAMUSCULAR; INTRAVENOUS PRN
Status: DISCONTINUED | OUTPATIENT
Start: 2022-02-15 | End: 2022-02-15 | Stop reason: SDUPTHER

## 2022-02-15 RX ORDER — PROPOFOL 10 MG/ML
INJECTION, EMULSION INTRAVENOUS PRN
Status: DISCONTINUED | OUTPATIENT
Start: 2022-02-15 | End: 2022-02-15 | Stop reason: SDUPTHER

## 2022-02-15 RX ORDER — SODIUM CHLORIDE, SODIUM LACTATE, POTASSIUM CHLORIDE, CALCIUM CHLORIDE 600; 310; 30; 20 MG/100ML; MG/100ML; MG/100ML; MG/100ML
INJECTION, SOLUTION INTRAVENOUS CONTINUOUS
Status: DISCONTINUED | OUTPATIENT
Start: 2022-02-15 | End: 2022-02-15 | Stop reason: HOSPADM

## 2022-02-15 RX ORDER — OXYCODONE AND ACETAMINOPHEN 7.5; 325 MG/1; MG/1
1 TABLET ORAL EVERY 4 HOURS PRN
Qty: 35 TABLET | Refills: 0 | Status: SHIPPED | OUTPATIENT
Start: 2022-02-15 | End: 2022-02-22

## 2022-02-15 RX ORDER — DIPHENHYDRAMINE HYDROCHLORIDE 50 MG/ML
12.5 INJECTION INTRAMUSCULAR; INTRAVENOUS ONCE
Status: DISCONTINUED | OUTPATIENT
Start: 2022-02-15 | End: 2022-02-15

## 2022-02-15 RX ORDER — HYDROMORPHONE HCL 110MG/55ML
PATIENT CONTROLLED ANALGESIA SYRINGE INTRAVENOUS PRN
Status: DISCONTINUED | OUTPATIENT
Start: 2022-02-15 | End: 2022-02-15 | Stop reason: SDUPTHER

## 2022-02-15 RX ORDER — OXYCODONE AND ACETAMINOPHEN 7.5; 325 MG/1; MG/1
1 TABLET ORAL EVERY 4 HOURS PRN
Qty: 30 TABLET | Refills: 0 | Status: SHIPPED | OUTPATIENT
Start: 2022-02-15 | End: 2022-02-22

## 2022-02-15 RX ADMIN — Medication 200 MCG: at 13:39

## 2022-02-15 RX ADMIN — MAGNESIUM SULFATE HEPTAHYDRATE 1 G: 500 INJECTION, SOLUTION INTRAMUSCULAR; INTRAVENOUS at 12:38

## 2022-02-15 RX ADMIN — ONDANSETRON 4 MG: 2 INJECTION INTRAMUSCULAR; INTRAVENOUS at 12:51

## 2022-02-15 RX ADMIN — ROCURONIUM BROMIDE 40 MG: 10 INJECTION, SOLUTION INTRAVENOUS at 12:25

## 2022-02-15 RX ADMIN — DEXAMETHASONE SODIUM PHOSPHATE 10 MG: 4 INJECTION, SOLUTION INTRAMUSCULAR; INTRAVENOUS at 12:51

## 2022-02-15 RX ADMIN — Medication 100 MCG: at 13:28

## 2022-02-15 RX ADMIN — ENOXAPARIN SODIUM 40 MG: 40 INJECTION SUBCUTANEOUS at 11:48

## 2022-02-15 RX ADMIN — Medication 100 MCG: at 13:33

## 2022-02-15 RX ADMIN — LIDOCAINE HYDROCHLORIDE 100 MG: 20 INJECTION, SOLUTION EPIDURAL; INFILTRATION; INTRACAUDAL; PERINEURAL at 12:23

## 2022-02-15 RX ADMIN — Medication 100 MCG: at 13:57

## 2022-02-15 RX ADMIN — SODIUM CHLORIDE, POTASSIUM CHLORIDE, SODIUM LACTATE AND CALCIUM CHLORIDE: 600; 310; 30; 20 INJECTION, SOLUTION INTRAVENOUS at 12:07

## 2022-02-15 RX ADMIN — ROCURONIUM BROMIDE 10 MG: 10 INJECTION, SOLUTION INTRAVENOUS at 13:04

## 2022-02-15 RX ADMIN — HYDROMORPHONE HYDROCHLORIDE 0.5 MG: 2 INJECTION, SOLUTION INTRAMUSCULAR; INTRAVENOUS; SUBCUTANEOUS at 13:57

## 2022-02-15 RX ADMIN — HYDROMORPHONE HYDROCHLORIDE 0.5 MG: 2 INJECTION, SOLUTION INTRAMUSCULAR; INTRAVENOUS; SUBCUTANEOUS at 15:06

## 2022-02-15 RX ADMIN — CEFAZOLIN 2000 MG: 10 INJECTION, POWDER, FOR SOLUTION INTRAVENOUS at 12:13

## 2022-02-15 RX ADMIN — HYDROMORPHONE HYDROCHLORIDE 0.5 MG: 2 INJECTION, SOLUTION INTRAMUSCULAR; INTRAVENOUS; SUBCUTANEOUS at 14:05

## 2022-02-15 RX ADMIN — Medication 200 MCG: at 13:49

## 2022-02-15 RX ADMIN — Medication 100 MCG: at 13:15

## 2022-02-15 RX ADMIN — ROCURONIUM BROMIDE 10 MG: 10 INJECTION, SOLUTION INTRAVENOUS at 13:43

## 2022-02-15 RX ADMIN — PROPOFOL 200 MG: 10 INJECTION, EMULSION INTRAVENOUS at 12:24

## 2022-02-15 RX ADMIN — HYDROMORPHONE HYDROCHLORIDE 0.5 MG: 2 INJECTION, SOLUTION INTRAMUSCULAR; INTRAVENOUS; SUBCUTANEOUS at 13:45

## 2022-02-15 RX ADMIN — MIDAZOLAM 2 MG: 1 INJECTION INTRAMUSCULAR; INTRAVENOUS at 12:15

## 2022-02-15 RX ADMIN — FENTANYL CITRATE 50 MCG: 50 INJECTION, SOLUTION INTRAMUSCULAR; INTRAVENOUS at 12:35

## 2022-02-15 RX ADMIN — SUGAMMADEX 200 MG: 100 INJECTION, SOLUTION INTRAVENOUS at 14:05

## 2022-02-15 RX ADMIN — ONDANSETRON 4 MG: 2 INJECTION INTRAMUSCULAR; INTRAVENOUS at 15:20

## 2022-02-15 RX ADMIN — FENTANYL CITRATE 50 MCG: 50 INJECTION, SOLUTION INTRAMUSCULAR; INTRAVENOUS at 12:22

## 2022-02-15 RX ADMIN — HYDROMORPHONE HYDROCHLORIDE 0.5 MG: 2 INJECTION, SOLUTION INTRAMUSCULAR; INTRAVENOUS; SUBCUTANEOUS at 13:04

## 2022-02-15 RX ADMIN — SODIUM CHLORIDE, POTASSIUM CHLORIDE, SODIUM LACTATE AND CALCIUM CHLORIDE: 600; 310; 30; 20 INJECTION, SOLUTION INTRAVENOUS at 13:39

## 2022-02-15 RX ADMIN — Medication 200 MCG: at 14:01

## 2022-02-15 RX ADMIN — Medication 200 MCG: at 13:25

## 2022-02-15 ASSESSMENT — PULMONARY FUNCTION TESTS
PIF_VALUE: 13
PIF_VALUE: 18
PIF_VALUE: 18
PIF_VALUE: 17
PIF_VALUE: 18
PIF_VALUE: 2
PIF_VALUE: 16
PIF_VALUE: 20
PIF_VALUE: 19
PIF_VALUE: 19
PIF_VALUE: 17
PIF_VALUE: 19
PIF_VALUE: 4
PIF_VALUE: 19
PIF_VALUE: 19
PIF_VALUE: 20
PIF_VALUE: 1
PIF_VALUE: 18
PIF_VALUE: 0
PIF_VALUE: 18
PIF_VALUE: 17
PIF_VALUE: 10
PIF_VALUE: 17
PIF_VALUE: 20
PIF_VALUE: 17
PIF_VALUE: 18
PIF_VALUE: 20
PIF_VALUE: 19
PIF_VALUE: 19
PIF_VALUE: 17
PIF_VALUE: 1
PIF_VALUE: 17
PIF_VALUE: 19
PIF_VALUE: 20
PIF_VALUE: 17
PIF_VALUE: 18
PIF_VALUE: 16
PIF_VALUE: 20
PIF_VALUE: 17
PIF_VALUE: 17
PIF_VALUE: 19
PIF_VALUE: 17
PIF_VALUE: 19
PIF_VALUE: 20
PIF_VALUE: 20
PIF_VALUE: 17
PIF_VALUE: 19
PIF_VALUE: 19
PIF_VALUE: 15
PIF_VALUE: 1
PIF_VALUE: 19
PIF_VALUE: 17
PIF_VALUE: 1
PIF_VALUE: 11
PIF_VALUE: 17
PIF_VALUE: 16
PIF_VALUE: 18
PIF_VALUE: 12
PIF_VALUE: 17
PIF_VALUE: 15
PIF_VALUE: 0
PIF_VALUE: 19
PIF_VALUE: 19
PIF_VALUE: 16
PIF_VALUE: 0
PIF_VALUE: 18
PIF_VALUE: 18
PIF_VALUE: 19
PIF_VALUE: 17
PIF_VALUE: 2
PIF_VALUE: 19
PIF_VALUE: 20
PIF_VALUE: 18
PIF_VALUE: 30
PIF_VALUE: 18
PIF_VALUE: 18
PIF_VALUE: 20
PIF_VALUE: 18
PIF_VALUE: 16
PIF_VALUE: 18
PIF_VALUE: 20
PIF_VALUE: 17
PIF_VALUE: 0
PIF_VALUE: 18
PIF_VALUE: 19
PIF_VALUE: 19
PIF_VALUE: 16
PIF_VALUE: 20
PIF_VALUE: 20
PIF_VALUE: 15
PIF_VALUE: 18
PIF_VALUE: 20
PIF_VALUE: 17
PIF_VALUE: 19
PIF_VALUE: 18
PIF_VALUE: 2
PIF_VALUE: 2
PIF_VALUE: 16
PIF_VALUE: 17
PIF_VALUE: 21
PIF_VALUE: 19
PIF_VALUE: 17
PIF_VALUE: 19
PIF_VALUE: 19
PIF_VALUE: 15
PIF_VALUE: 19
PIF_VALUE: 20
PIF_VALUE: 1
PIF_VALUE: 22
PIF_VALUE: 18
PIF_VALUE: 13
PIF_VALUE: 18
PIF_VALUE: 18
PIF_VALUE: 0
PIF_VALUE: 20
PIF_VALUE: 19
PIF_VALUE: 20
PIF_VALUE: 11
PIF_VALUE: 19

## 2022-02-15 ASSESSMENT — PAIN DESCRIPTION - LOCATION: LOCATION: ABDOMEN

## 2022-02-15 ASSESSMENT — PAIN SCALES - GENERAL
PAINLEVEL_OUTOF10: 4
PAINLEVEL_OUTOF10: 10
PAINLEVEL_OUTOF10: 6

## 2022-02-15 ASSESSMENT — PAIN DESCRIPTION - ORIENTATION: ORIENTATION: LOWER

## 2022-02-15 ASSESSMENT — PAIN DESCRIPTION - PAIN TYPE: TYPE: SURGICAL PAIN

## 2022-02-15 ASSESSMENT — PAIN - FUNCTIONAL ASSESSMENT: PAIN_FUNCTIONAL_ASSESSMENT: 0-10

## 2022-02-15 NOTE — PROGRESS NOTES
Adm to pacu bay # 10 from or per cart unresponsive. Oral airway in place. Respirations symmetrical. Abdomen soft with dressings to umbilicus & lower abdomen dry & intact. Abdominal binder in place. HERMELINDO patent to bulb suction but not compressed d/t local anesthetic. Pain ball patent with clamps open. VSS. Report received from or staff.

## 2022-02-15 NOTE — PROGRESS NOTES
CLINICAL PHARMACY NOTE: MEDS TO BEDS    Total # of Prescriptions Filled: 4   The following medications were delivered to the patient:  · Xarelto 10 mg  · Flexeril 10 mg  · Keflex 500 mg  · Percocet 7.5-325 mg    Additional Documentation:    Delivered to Patient mom=signed  Nikki Rodríguez CPhT

## 2022-02-15 NOTE — PROGRESS NOTES
Discharge instructions given to pt at bedside & mother Katrina in waiting room. Understanding verbalized.

## 2022-02-15 NOTE — PROGRESS NOTES
Awake alert & oriented but drowsy. Respirations easy & symmetrical. Abdomen soft. Dressings to umbilicus & lower abdomen dry & intact. HERMELINDO patent & draining light red drainage. Pain ball patent. Clamps open. Abdominal binder in place. States pain is tolerable at level 4/10. Patient discharged per wheelchair to the care of responsible party. No additional questions voiced related to discharge information. Patient discharged with all personal items.

## 2022-02-15 NOTE — H&P
I have reviewed the history and physical and examined the patient and find no relevant changes. I have reviewed with the patient and/or family the risks, benefits, and alternatives to the procedure. Patient has no known allergies. Last recorded vitals:  Temp 97.8 °F (36.6 °C) (Temporal)   Ht 5' 8\" (1.727 m)   Wt 220 lb (99.8 kg)   LMP 2022   BMI 33.45 kg/m²     Past Surgical History:   Procedure Laterality Date     SECTION      CHOLECYSTECTOMY         Prior to Admission medications    Medication Sig Start Date End Date Taking?  Authorizing Provider   acetaminophen (TYLENOL) 325 MG tablet Take 650 mg by mouth every 6 hours as needed for Pain    Historical Provider, MD   FEROSUL 325 (65 Fe) MG tablet Take 650 mg by mouth daily 19   Historical Provider, MD         Current Facility-Administered Medications:     ceFAZolin (ANCEF) 2000 mg in dextrose 5 % 50 mL IVPB, 2,000 mg, IntraVENous, On Call to OR, Peter Beckett MD    lactated ringers infusion, , IntraVENous, Continuous, Ana Cisneros MD    lidocaine PF 1 % injection 0.5 mL, 0.5 mL, IntraDERmal, Once, Peter Beckett MD    enoxaparin (LOVENOX) injection 40 mg, 40 mg, SubCUTAneous, Once, Peter Beckett MD    meperidine (DEMEROL) injection 12.5 mg, 12.5 mg, IntraVENous, Q5 Min PRN, Liz Henry MD    HYDROmorphone (DILAUDID) injection 0.5 mg, 0.5 mg, IntraVENous, Q5 Min PRN, Liz Henry MD    oxyCODONE-acetaminophen (PERCOCET) 5-325 MG per tablet 1 tablet, 1 tablet, Oral, Once PRN, Liz Henry MD    ondansetron [facility]) injection 4 mg, 4 mg, IntraVENous, Once PRN, Liz Henry MD    labetalol (NORMODYNE;TRANDATE) injection 5 mg, 5 mg, IntraVENous, Q10 Min PRN, Liz Henry MD    hydrALAZINE (APRESOLINE) injection 5 mg, 5 mg, IntraVENous, Q10 Min PRN, Liz Henry MD    [id number] Beckett MD  2/15/2022

## 2022-02-15 NOTE — ANESTHESIA PRE PROCEDURE
Department of Anesthesiology  Preprocedure Note       Name:  Radha Lawson   Age:  27 y. o.  :  1995                                          MRN:  [id number]         Date:  2/15/2022      Surgeon: India Slater): Michelle Fernando MD    Procedure: Procedure(s):  ABDOMINOPLASTY    Medications prior to admission:   Prior to Admission medications    Medication Sig Start Date End Date Taking? Authorizing Provider   acetaminophen (TYLENOL) 325 MG tablet Take 650 mg by mouth every 6 hours as needed for Pain    Historical Provider, MD   FEROSUL 325 (65 Fe) MG tablet Take 650 mg by mouth daily 19   Historical Provider, MD       Current medications:    Current Facility-Administered Medications   Medication Dose Route Frequency Provider Last Rate Last Admin    ceFAZolin (ANCEF) 2000 mg in dextrose 5 % 50 mL IVPB  2,000 mg IntraVENous On Call to [address] [place] [address] MD        lactated ringers infusion   IntraVENous Continuous Ana Cisneros MD        lidocaine PF 1 % injection 0.5 mL  0.5 mL IntraDERmal Once Michelle Fernando MD        enoxaparin (LOVENOX) injection 40 mg  40 mg SubCUTAneous Once Michelle Fernando MD           Allergies:  No Known Allergies    Problem List:  There is no problem list on file for this patient. Past Medical History:        Diagnosis Date    Anemia     Asthma        Past Surgical History:        Procedure Laterality Date     SECTION      CHOLECYSTECTOMY         Social History:    Social History     Tobacco Use    Smoking status: Never Smoker    Smokeless tobacco: Never Used   Substance Use Topics    Alcohol use:  No                                Counseling given: Not Answered      Vital Signs (Current):   Vitals:    22 1002 02/15/22 1139   Temp:  97.8 °F (36.6 °C)   TempSrc:  Temporal   Weight:  220 lb (99.8 kg)   Height: 5' 8.5\" (1.74 m) 5' 8\" (1.727 m)                                              BP Readings from Last 3 Encounters:   10/29/19 120/86   19 104/67   19 110/75 NPO Status:                                                                                 BMI:   Wt Readings from Last 3 Encounters:   02/15/22 220 lb (99.8 kg)   10/29/19 204 lb 2.3 oz (92.6 kg)   06/29/19 211 lb 13.8 oz (96.1 kg)     Body mass index is 33.45 kg/m². CBC:   Lab Results   Component Value Date    WBC 4.9 10/29/2019    RBC 4.28 10/29/2019    HGB 13.1 10/29/2019    HCT 38.8 10/29/2019    MCV 90.8 10/29/2019    RDW 15.0 10/29/2019     10/29/2019       CMP:   Lab Results   Component Value Date     10/29/2019    K 3.3 10/29/2019     10/29/2019    CO2 25 10/29/2019    BUN 5 10/29/2019    CREATININE 0.6 10/29/2019    GFRAA >60 10/29/2019    AGRATIO 1.1 10/29/2019    LABGLOM >60 10/29/2019    GLUCOSE 104 10/29/2019    PROT 7.5 10/29/2019    CALCIUM 9.4 10/29/2019    BILITOT <0.2 10/29/2019    ALKPHOS 98 10/29/2019    AST 18 10/29/2019    ALT 16 10/29/2019       POC Tests: No results for input(s): POCGLU, POCNA, POCK, POCCL, POCBUN, POCHEMO, POCHCT in the last 72 hours. Coags: No results found for: PROTIME, INR, APTT    HCG (If Applicable):   Lab Results   Component Value Date    PREGTESTUR Negative 10/29/2019        ABGs: No results found for: PHART, PO2ART, COB0RZR, GZQ5TJV, BEART, D2ECELZO     Type & Screen (If Applicable):  No results found for: LABABO, LABRH    Drug/Infectious Status (If Applicable):  No results found for: HIV, HEPCAB    COVID-19 Screening (If Applicable): No results found for: COVID19        Anesthesia Evaluation  Patient summary reviewed and Nursing notes reviewed  Airway: Mallampati: II        Dental:          Pulmonary:   (+) asthma:                            Cardiovascular:                      Neuro/Psych:               GI/Hepatic/Renal:             Endo/Other:                     Abdominal:             Vascular:           Other Findings:             Anesthesia Plan      general     ASA 2                                 Elsie MD Sushma 2/15/2022

## 2022-02-15 NOTE — PROGRESS NOTES
C/O itching. No hives or rash noted. States she \"always itches after surgery\". Denies need for benadryl. States pain is tolerable. Meet criteria for phase 2.

## 2022-02-16 NOTE — OP NOTE
[facility] [address]                     [address] [facility], [address] [place] [address]                                OPERATIVE REPORT    PATIENT NAME: Joce Winter                      :        1995  MED REC NO:   [id number]                          ROOM:  ACCOUNT NO:   [de-identified]                           ADMIT DATE: 02/15/2022  PROVIDER:     Niurka Rivrea MD    DATE OF PROCEDURE:  02/15/2022    PREOPERATIVE DIAGNOSIS:  Lipodystrophy of abdomen. POSTOPERATIVE DIAGNOSIS:  Lipodystrophy of abdomen. OPERATION PERFORMED:  Abdominoplasty. SURGEON:  Niurka Rivera MD    ANESTHESIA:  General.    BLOOD LOSS:  Less than 50 mL. INDICATIONS:  This is a 32-year-old black female seen in consultation  for severe lipodystrophy of abdomen as well as large ventral hernia. After discussing the options at length, elected to proceed with  definitive abdominoplasty and repair of ventral hernia. The issue of  bleeding, infection, wound dehiscence, scarring as well as the issue of  recurrence of the hernia as well as DVT formation which may lead to  pulmonary embolus was discussed at length and consent was obtained. OPERATIVE PROCEDURE:  The patient was taken to the operating room after  appropriate markings and photographs were done. Placed in supine  position, at which time general anesthesia was obtained. The abdomen  was then sterilely prepped and draped in the usual fashion using  ChloraPrep solution. We then made an incision just below the previous   scar and taken down to rectus abdominis fascia. All vessels  were appropriately ligated with 3-0 Vicryl ties. Skin flaps were  carefully elevated above the fascial plane up around the umbilicus up to  the xiphoid. The patient had apparently significant diastasis. This  was then approximated in three separate layers of #1 Nurolon from  xiphoid to umbilicus and umbilicus to the suprapubic region.   Wound was  then copiously irrigated with Ancef solution and once again after  confirmation of hemostasis, the hips were flexed, head elevated, and  appropriate skin resection and contouring was then performed. The new  position of the umbilicus was identified and brought out using 4-0  Vicryl at the 12, 3, 6, and 9 o'clock position using dermal-dermal  fascial type suture and skin was closed using 5-0 nylon. After placing  #15 HERMELINDO drain and the On-Q catheter primed with 0.5% plain Marcaine, the  abdominal wall itself was closed using 0 Vicryl, 2-0 Vicryl, and 3-0  Monocryl. Steri-Strips, Telfa and Tegaderm dressing were applied. The  abdominal binder was then placed and the patient was taken to recovery  room in satisfactory condition. INSTRUCTIONS:  Keep the head elevated at all times, hips flexed and she  is to ambulate every hour while awake. She is also advised to do some  deep breathing, coughing. I did give her a script for Percocet, Keflex  as well as Flexeril and Xarelto. The patient did get a dose of Lovenox  preoperatively.         Tato Landry MD    D: 02/15/2022 16:01:07       T: 02/16/2022 3:31:36     [email or web address]  Job#: [id number]     Doc#: [id number]    CC:

## 2022-09-15 ENCOUNTER — HOSPITAL ENCOUNTER (EMERGENCY)
Age: 27
Discharge: HOME | End: 2022-09-15
Attending: EMERGENCY MEDICINE
Payer: OTHER MISCELLANEOUS

## 2022-09-15 VITALS
RESPIRATION RATE: 16 BRPM | HEIGHT: 68 IN | TEMPERATURE: 98 F | HEART RATE: 58 BPM | OXYGEN SATURATION: 100 % | WEIGHT: 206.79 LBS | BODY MASS INDEX: 31.34 KG/M2 | SYSTOLIC BLOOD PRESSURE: 111 MMHG | DIASTOLIC BLOOD PRESSURE: 76 MMHG
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
## 2022-09-15 DIAGNOSIS — M54.6 ACUTE BILATERAL THORACIC BACK PAIN: ICD-10-CM

## 2022-09-15 PROCEDURE — 99283 EMERGENCY DEPT VISIT LOW MDM: CPT

## 2022-09-15 RX ORDER — ACETAMINOPHEN 500 MG
500 TABLET ORAL 4 TIMES DAILY PRN
Qty: 20 TABLET | Refills: 0 | Status: SHIPPED | OUTPATIENT
Start: 2022-09-15

## 2022-09-15 ASSESSMENT — ENCOUNTER SYMPTOMS
FACIAL SWELLING: 0
ABDOMINAL PAIN: 0
VOICE CHANGE: 0
BACK PAIN: 1
NAUSEA: 0
SHORTNESS OF BREATH: 0
WHEEZING: 0
TROUBLE SWALLOWING: 0
COLOR CHANGE: 0
VOMITING: 0
STRIDOR: 0
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
## 2022-09-15 NOTE — ED NOTES
Patient ambulatory from ED. AVS provided and discussed with patient. All questions answered. Patient verbalizes understanding of discharge instructions. Respirations even and easy. No obvious distress at this time.        Alfonzo Sherman RN  09/15/22 3423

## 2023-08-10 ENCOUNTER — HOSPITAL ENCOUNTER (EMERGENCY)
Age: 28
Discharge: HOME | End: 2023-08-10
Attending: EMERGENCY MEDICINE
Payer: COMMERCIAL

## 2023-08-10 VITALS
TEMPERATURE: 98 F | SYSTOLIC BLOOD PRESSURE: 132 MMHG | DIASTOLIC BLOOD PRESSURE: 89 MMHG | RESPIRATION RATE: 16 BRPM | OXYGEN SATURATION: 100 % | HEART RATE: 74 BPM

## 2023-08-10 DIAGNOSIS — N30.01 ACUTE CYSTITIS WITH HEMATURIA: ICD-10-CM

## 2023-08-10 DIAGNOSIS — R30.0 DYSURIA: Primary | ICD-10-CM

## 2023-08-10 LAB
BACTERIA URNS QL MICRO: ABNORMAL /HPF
BILIRUB UR QL STRIP.AUTO: NEGATIVE
CLARITY UR: ABNORMAL
COLOR UR: ABNORMAL
EPI CELLS #/AREA URNS HPF: ABNORMAL /HPF (ref 0–5)
GLUCOSE UR STRIP.AUTO-MCNC: 100 MG/DL
HCG UR QL: NEGATIVE
HGB UR QL STRIP.AUTO: ABNORMAL
KETONES UR STRIP.AUTO-MCNC: NEGATIVE MG/DL
LEUKOCYTE ESTERASE UR QL STRIP.AUTO: ABNORMAL
MUCOUS THREADS #/AREA URNS LPF: ABNORMAL /LPF
NITRITE UR QL STRIP.AUTO: POSITIVE
PH UR STRIP.AUTO: 6.5 [PH] (ref 5–8)
PROT UR STRIP.AUTO-MCNC: >=300 MG/DL
RBC #/AREA URNS HPF: ABNORMAL /HPF (ref 0–4)
SP GR UR STRIP.AUTO: 1.02 (ref 1–1.03)
UA COMPLETE W REFLEX CULTURE PNL UR: YES
UA DIPSTICK W REFLEX MICRO PNL UR: YES
URN SPEC COLLECT METH UR: ABNORMAL
UROBILINOGEN UR STRIP-ACNC: 1 E.U./DL
WBC #/AREA URNS HPF: ABNORMAL /HPF (ref 0–5)

## 2023-08-10 PROCEDURE — 81001 URINALYSIS AUTO W/SCOPE: CPT

## 2023-08-10 PROCEDURE — 6370000000 HC RX 637 (ALT 250 FOR IP): Performed by: EMERGENCY MEDICINE

## 2023-08-10 PROCEDURE — 84703 CHORIONIC GONADOTROPIN ASSAY: CPT

## 2023-08-10 PROCEDURE — 87086 URINE CULTURE/COLONY COUNT: CPT

## 2023-08-10 PROCEDURE — 99283 EMERGENCY DEPT VISIT LOW MDM: CPT

## 2023-08-10 RX ORDER — CEFUROXIME AXETIL 250 MG/1
250 TABLET ORAL 2 TIMES DAILY
Qty: 14 TABLET | Refills: 0 | Status: SHIPPED | OUTPATIENT
Start: 2023-08-10 | End: 2023-08-17

## 2023-08-10 RX ORDER — CEFUROXIME AXETIL 250 MG/1
500 TABLET ORAL ONCE
Status: COMPLETED | OUTPATIENT
Start: 2023-08-10 | End: 2023-08-10

## 2023-08-10 RX ADMIN — CEFUROXIME AXETIL 500 MG: 250 TABLET ORAL at 22:59

## 2023-08-11 NOTE — ED NOTES
Patient DCed from ED at this time. Discussed AVS, follow up, and scripts. They verbalized understanding. Reinforced that should symptoms persist or worsen to return to the ED. They verbalized understanding. Patient ambulated out of ED. RN thanked patient for choosing [facility]).         Suzy Lucio RN  08/10/23 3548

## 2023-08-12 LAB — BACTERIA UR CULT: NORMAL

## 2023-11-15 ENCOUNTER — HOSPITAL ENCOUNTER (EMERGENCY)
Age: 28
Discharge: HOME | End: 2023-11-15
Attending: EMERGENCY MEDICINE
Payer: COMMERCIAL

## 2023-11-15 VITALS
DIASTOLIC BLOOD PRESSURE: 89 MMHG | SYSTOLIC BLOOD PRESSURE: 137 MMHG | OXYGEN SATURATION: 100 % | WEIGHT: 219.58 LBS | HEIGHT: 68 IN | TEMPERATURE: 98.4 F | RESPIRATION RATE: 18 BRPM | HEART RATE: 84 BPM | BODY MASS INDEX: 33.28 KG/M2

## 2023-11-15 DIAGNOSIS — M67.431 GANGLION CYST OF BOTH WRISTS: Primary | ICD-10-CM

## 2023-11-15 DIAGNOSIS — M67.432 GANGLION CYST OF BOTH WRISTS: Primary | ICD-10-CM

## 2023-11-15 PROCEDURE — 99282 EMERGENCY DEPT VISIT SF MDM: CPT

## 2023-11-15 ASSESSMENT — PAIN - FUNCTIONAL ASSESSMENT: PAIN_FUNCTIONAL_ASSESSMENT: 0-10

## 2023-11-15 ASSESSMENT — PAIN SCALES - GENERAL: PAINLEVEL_OUTOF10: 0

## 2023-11-16 NOTE — ED NOTES
Patient ambulatory from ED. AVS provided and discussed with patient. All questions answered. Patient verbalizes understanding of discharge instructions. Respirations even and easy. No obvious distress at this time.       Jody Malone RN  11/15/23 8925

## 2023-11-16 NOTE — DISCHARGE INSTRUCTIONS
[facility] you for [facility]. You need to call in morning to make appointment as directed with appropriate doctor. Should you have any questions regarding your care or further treatment, please call [facility] Department at [phone]. Take any medications as prescribed, if given any, otherwise for pain Use ibuprofen or Tylenol (unless prescribed medications that have Tylenol in it). You can take over the counter Ibuprofen (advil) tablets (4 tablets every 8 hours or 3 tablets every 6 hours or 2 tablets every 4 hours)    Return to ED if symptoms worsen, do not improve, fever > 101.5, excessive nausea or vomiting, and unable to follow up with your physician, or any other care or concern.

## 2023-11-16 NOTE — ED TRIAGE NOTES
Pt arrives to ED with concerns of cysts on wrists. Pt denies pain at this time. Pt states she noticed them yesterday. Pt was discharged from ER visit yesterday for pain in her R hand with flexion.

## 2024-08-11 ENCOUNTER — HOSPITAL ENCOUNTER (EMERGENCY)
Age: 29
Discharge: HOME | End: 2024-08-11
Attending: STUDENT IN AN ORGANIZED HEALTH CARE EDUCATION/TRAINING PROGRAM
Payer: COMMERCIAL

## 2024-08-11 VITALS
DIASTOLIC BLOOD PRESSURE: 84 MMHG | SYSTOLIC BLOOD PRESSURE: 134 MMHG | OXYGEN SATURATION: 100 % | TEMPERATURE: 98.5 F | HEIGHT: 68 IN | HEART RATE: 78 BPM | WEIGHT: 244.93 LBS | BODY MASS INDEX: 37.12 KG/M2 | RESPIRATION RATE: 16 BRPM

## 2024-08-11 DIAGNOSIS — H92.01 OTALGIA OF RIGHT EAR: Primary | ICD-10-CM

## 2024-08-11 DIAGNOSIS — N89.8 VAGINAL DISCHARGE: ICD-10-CM

## 2024-08-11 DIAGNOSIS — N76.0 BACTERIAL VAGINOSIS: ICD-10-CM

## 2024-08-11 DIAGNOSIS — B96.89 BACTERIAL VAGINOSIS: ICD-10-CM

## 2024-08-11 LAB
BACTERIA GENITAL QL WET PREP: ABNORMAL
CLUE CELLS SPEC QL WET PREP: ABNORMAL
EPI CELLS SPEC QL WET PREP: ABNORMAL
HCG UR QL: NEGATIVE
RBC SPEC QL WET PREP: ABNORMAL
SPECIMEN SOURCE FLD: ABNORMAL
T VAGINALIS GENITAL QL WET PREP: ABNORMAL
WBC SPEC QL WET PREP: ABNORMAL
YEAST GENITAL QL WET PREP: ABNORMAL

## 2024-08-11 PROCEDURE — 84703 CHORIONIC GONADOTROPIN ASSAY: CPT

## 2024-08-11 PROCEDURE — 87210 SMEAR WET MOUNT SALINE/INK: CPT

## 2024-08-11 PROCEDURE — 87591 N.GONORRHOEAE DNA AMP PROB: CPT

## 2024-08-11 PROCEDURE — 99283 EMERGENCY DEPT VISIT LOW MDM: CPT

## 2024-08-11 PROCEDURE — 87491 CHLMYD TRACH DNA AMP PROBE: CPT

## 2024-08-11 RX ORDER — METRONIDAZOLE 7.5 MG/G
1 GEL VAGINAL DAILY
Qty: 70 G | Refills: 0 | Status: SHIPPED | OUTPATIENT
Start: 2024-08-11 | End: 2024-08-16

## 2024-08-11 RX ORDER — SEMAGLUTIDE 1.34 MG/ML
0.25 INJECTION, SOLUTION SUBCUTANEOUS WEEKLY
COMMUNITY

## 2024-08-11 ASSESSMENT — PAIN DESCRIPTION - LOCATION: LOCATION: EAR;HEAD

## 2024-08-11 ASSESSMENT — PAIN DESCRIPTION - DESCRIPTORS: DESCRIPTORS: ACHING;TIGHTNESS

## 2024-08-11 ASSESSMENT — LIFESTYLE VARIABLES
HOW OFTEN DO YOU HAVE A DRINK CONTAINING ALCOHOL: NEVER
HOW MANY STANDARD DRINKS CONTAINING ALCOHOL DO YOU HAVE ON A TYPICAL DAY: PATIENT DOES NOT DRINK

## 2024-08-11 ASSESSMENT — PAIN DESCRIPTION - ORIENTATION: ORIENTATION: RIGHT

## 2024-08-11 ASSESSMENT — PAIN - FUNCTIONAL ASSESSMENT: PAIN_FUNCTIONAL_ASSESSMENT: 0-10

## 2024-08-11 ASSESSMENT — PAIN DESCRIPTION - PAIN TYPE: TYPE: ACUTE PAIN

## 2024-08-12 LAB
C TRACH DNA CVX QL NAA+PROBE: NEGATIVE
N GONORRHOEA DNA CERV MUCUS QL NAA+PROBE: NEGATIVE

## 2024-08-12 NOTE — DISCHARGE INSTRUCTIONS
Please follow-up with ENT for ear pain.  If symptoms worsen please come back to the emergency department.  Please follow-up with gynecology.  Please follow the results of gonorrhea chlamydia testing online

## 2024-08-12 NOTE — ED TRIAGE NOTES
Pt into ED for headache/right ear pain, pain 5/10. Pt states the headache began about 2 weeks ago accompanied by ear pain with no relief. Pt has not taken any medication for pain relief. Pt also states she has \"recurrent BV and wanted to get her medication changed because her current medication (metronidazole) is not working\".

## 2024-08-12 NOTE — ED NOTES
D/C: Order noted for d/c. Pt confirmed d/c paperwork has correct name. Discharge and education instructions reviewed with patient. Teach-back successful.  Pt verbalized understanding and denied questions at this time. No acute distress noted. Patient instructed to follow-up as noted - return to emergency department if symptoms worsen. Patient verbalized understanding. Discharged per EDMD with discharge instructions. Pt discharged to private vehicle. Patient stable upon departure. Thanked patient for choosing [facility] for care.

## 2024-08-13 NOTE — ED PROVIDER NOTES
[facility]    CHIEF COMPLAINT  Headache (Pt into ED for headache/right ear pain, pain 5/10. Pt states the headache began about 2 weeks ago accompanied by ear pain with no relief. )       HISTORY OF PRESENT ILLNESS  Aarti Patel is a 29 y.o. female presenting to the ED for headache right ear pain.  Also the symptoms began ago.  Patient denies thunderclap onset of headache.  Headache is associated with her ear pain.  Patient denies any hearing loss.  Patient denies any trauma or self-induced damage to tympanic membrane.  Patient denies any ear drainage.  Patient states she has headache pain is associated proportion of head on right side.  Patient denies any fever, neck pain, sore throat.  Patient denies upper respiratory symptoms.  Patient also reports concerns for bacterial vaginosis.  Patient plan of discharge.  Patient states she has had multiple bacterial vaginosis infections that required treatment.  Patient states that usually clears after antibiotics however comes back.  Patient has never followed up with gynecology for this.  Patient states vaginal discharge is milky in nature.    - History obtained from: Patient   - Limitations to history: none    I have reviewed the following from the nursing documentation:    Past Medical History:   Diagnosis Date    Anemia     Asthma     Pre-diabetes      Past Surgical History:   Procedure Laterality Date    ABDOMINOPLASTY N/A 02/15/2022    ABDOMINOPLASTY performed by Ana Cisneros MD at [facility] OR     SECTION      CHOLECYSTECTOMY      TUBAL LIGATION       History reviewed. No pertinent family history.  Social History     Socioeconomic History    Marital status: Single     Spouse name: Not on file    Number of children: Not on file    Years of education: Not on file    Highest education level: Not on file   Occupational History    Not on file   Tobacco Use    Smoking status: Never    Smokeless tobacco: Never   Substance and Sexual

## (undated) DEVICE — STAPLER SKIN H3.9MM WIRE DIA0.58MM CRWN 6.9MM 35 STPL ROT

## (undated) DEVICE — SUTURE ABSORBABLE BRAIDED 3-0 12X18 IN COAT UD VICRYL + VCP110G

## (undated) DEVICE — BLANKET WRM W40.2XL55.9IN IORT LO BODY + MISTRAL AIR

## (undated) DEVICE — DRAPE,CHEST,FENES,15X10,STERIL: Brand: MEDLINE

## (undated) DEVICE — SUTURE ETHLN SZ 3-0 L18IN NONABSORBABLE BLK PS-2 L19MM 3/8 1669H

## (undated) DEVICE — DRESSING,GAUZE,XEROFORM,CURAD,1"X8",ST: Brand: CURAD

## (undated) DEVICE — SUTURE VCRL + SZ 4-0 L27IN ABSRB UD PS-2 3/8 CIR REV CUT VCP426H

## (undated) DEVICE — YANKAUER,BULB TIP,W/O VENT,RIGID,STERILE: Brand: MEDLINE

## (undated) DEVICE — GAUZE,SPONGE,4"X4",8PLY,STRL,LF,10/TRAY: Brand: MEDLINE

## (undated) DEVICE — DRAIN SURG 15FR RND FULL FLUT

## (undated) DEVICE — SUTURE MCRYL SZ 3 0 L18IN ABSRB UD PS 2 3 8 CIR REV CUT NDL MCP497G

## (undated) DEVICE — APPLICATOR MEDICATED 26 CC SOLUTION HI LT ORNG CHLORAPREP

## (undated) DEVICE — INTENDED FOR TISSUE SEPARATION, AND OTHER PROCEDURES THAT REQUIRE A SHARP SURGICAL BLADE TO PUNCTURE OR CUT.: Brand: BARD-PARKER ® STAINLESS STEEL BLADES

## (undated) DEVICE — MASTISOL ADHESIVE LIQ 2/3ML

## (undated) DEVICE — SPONGE LAP W18XL18IN WHT COT 4 PLY FLD STRUNG RADPQ DISP ST

## (undated) DEVICE — SUTURE VCRL SZ 0 L36IN ABSRB UD CT-1 L36MM 1/2 CIR TAPR PNT VCP946H

## (undated) DEVICE — AMD ANTIMICROBIAL DRAIN SPONGES, 6 PLY, 0.2% POLYHEXAMETHYLENE BIGUANIDE HCI (PHMB): Brand: EXCILON

## (undated) DEVICE — PAD ABSRB W8XL10IN ABD HYDROPHOBIC NONWOVEN THCK LAYR CELOS

## (undated) DEVICE — SUTURE VCRL + SZ 2-0 L27IN ABSRB WHT SH 1/2 CIR TAPERCUT VCP417H

## (undated) DEVICE — SPONGE DRN W4XL4IN RAYON/POLYESTER 6 PLY NONWOVEN PRECUT

## (undated) DEVICE — PAD,NON-ADHERENT,3X8,STERILE,LF,1/PK: Brand: MEDLINE

## (undated) DEVICE — PENCIL ES ULT VAC W TELSCP NOSE EZ CLN BLDE 10FT

## (undated) DEVICE — GOWN SIRUS NONREIN LG W/TWL: Brand: MEDLINE INDUSTRIES, INC.

## (undated) DEVICE — SKIN MARKER,REGULAR TIP WITH RULER: Brand: DEVON

## (undated) DEVICE — HYPODERMIC SAFETY NEEDLE: Brand: MAGELLAN

## (undated) DEVICE — PAD,ABDOMINAL,8"X10",ST,LF: Brand: MEDLINE

## (undated) DEVICE — TOWEL,OR,DSP,ST,BLUE,STD,4/PK,20PK/CS: Brand: MEDLINE

## (undated) DEVICE — SUTURE NONABSORBABLE MONOFILAMENT 5-0 PS-2 18 IN BLK ETHILON 1666H

## (undated) DEVICE — SUTURE NRLN SZ 1 L18IN NONABSORBABLE BLK L36MM CT-1 1/2 CIR C520D

## (undated) DEVICE — SOLUTION IRRIG 500ML 0.9% SOD CHL USP POUR PLAS BTL

## (undated) DEVICE — GLOVE SURG SZ 65 THK91MIL LTX FREE SYN POLYISOPRENE

## (undated) DEVICE — MAJOR SET UP PK

## (undated) DEVICE — MASC TURNOVER KIT: Brand: MEDLINE INDUSTRIES, INC.

## (undated) DEVICE — SUPPORT ORTHOPEDIC ABD 28-50 IN UNIV 10 IN ELASTIC PROCARE

## (undated) DEVICE — Device

## (undated) DEVICE — 3M™ TEGADERM™ TRANSPARENT FILM DRESSING FRAME STYLE, 1627, 4 IN X 10 IN (10 CM X 25 CM), 20/CT 4CT/CASE: Brand: 3M™ TEGADERM™

## (undated) DEVICE — 3M™ STERI-STRIP™ REINFORCED ADHESIVE SKIN CLOSURES, R1547, 1/2 IN X 4 IN (12 MM X 100 MM), 6 STRIPS/ENVELOPE: Brand: 3M™ STERI-STRIP™